# Patient Record
Sex: FEMALE | Race: ASIAN | NOT HISPANIC OR LATINO | URBAN - METROPOLITAN AREA
[De-identification: names, ages, dates, MRNs, and addresses within clinical notes are randomized per-mention and may not be internally consistent; named-entity substitution may affect disease eponyms.]

---
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
## 2025-03-03 NOTE — ED PEDIATRIC TRIAGE NOTE - PRO INTERPRETER NEED 2
Nurse Triage SBAR    Is this a 2nd Level Triage? NO    Situation:  Labored breathing.     Background:  Per pt's daughter, Joy,who pt gave verbal consent to, pt/her mother is having labored breathing.     Assessment:  Severe difficulty breathing per daughter.     Protocol Recommended Disposition:   Call  Now   Joy verbalized understanding and agreement with plan of care and no further questions at this time.     Recommendation:           Does the patient meet one of the following criteria for ADS visit consideration? 16+ years old, no PCP (internal or external) but seen at [facility]     TIP  Providers, please consider if this condition is appropriate for management at one of our Acute and Diagnostic Services sites.     If patient is a good candidate, please use dotphrase <dot>triageresponse and select Refer to ADS to document.    Reason for Disposition   SEVERE difficulty breathing (e.g., struggling for each breath, speaks in single words)    Protocols used: Breathing Difficulty-A-AH     English